# Patient Record
Sex: FEMALE | Race: WHITE | NOT HISPANIC OR LATINO | Employment: UNEMPLOYED | URBAN - NONMETROPOLITAN AREA
[De-identification: names, ages, dates, MRNs, and addresses within clinical notes are randomized per-mention and may not be internally consistent; named-entity substitution may affect disease eponyms.]

---

## 2018-12-17 ENCOUNTER — OCC HEALTH (OUTPATIENT)
Dept: OCCUPATIONAL MEDICINE | Age: 23
End: 2018-12-17

## 2018-12-17 VITALS
DIASTOLIC BLOOD PRESSURE: 70 MMHG | BODY MASS INDEX: 28.14 KG/M2 | HEIGHT: 69 IN | HEART RATE: 70 BPM | SYSTOLIC BLOOD PRESSURE: 120 MMHG | WEIGHT: 190 LBS

## 2018-12-17 DIAGNOSIS — Z02.89 ENCOUNTER FOR OCCUPATIONAL HEALTH EXAMINATION: Primary | ICD-10-CM

## 2018-12-17 DIAGNOSIS — Z02.89 VISIT FOR OCCUPATIONAL HEALTH EXAMINATION: ICD-10-CM

## 2018-12-17 PROCEDURE — OH142 DRUG SCREEN HAIR TEST - COLLECTION,MRO: Performed by: PHYSICIAN ASSISTANT

## 2018-12-17 PROCEDURE — OH063 AUDIOMETRIC TESTING INTERP: Performed by: PHYSICIAN ASSISTANT

## 2018-12-17 PROCEDURE — OH021 PRE PLACEMENT PHYSICAL EXAM: Performed by: PHYSICIAN ASSISTANT

## 2018-12-17 PROCEDURE — 92552 PURE TONE AUDIOMETRY AIR: CPT | Performed by: PHYSICIAN ASSISTANT

## 2019-03-08 ENCOUNTER — OCC HEALTH (OUTPATIENT)
Dept: OCCUPATIONAL MEDICINE | Age: 24
End: 2019-03-08

## 2019-03-08 VITALS
WEIGHT: 185 LBS | HEIGHT: 69 IN | BODY MASS INDEX: 27.4 KG/M2 | DIASTOLIC BLOOD PRESSURE: 62 MMHG | SYSTOLIC BLOOD PRESSURE: 112 MMHG | HEART RATE: 80 BPM

## 2019-03-08 DIAGNOSIS — Z02.89 VISIT FOR OCCUPATIONAL HEALTH EXAMINATION: ICD-10-CM

## 2019-03-08 DIAGNOSIS — Z02.89 ENCOUNTER FOR OCCUPATIONAL HEALTH EXAMINATION: Primary | ICD-10-CM

## 2019-03-08 DIAGNOSIS — Z11.1 SCREENING FOR TUBERCULOSIS: ICD-10-CM

## 2019-03-08 PROCEDURE — OH021 PRE PLACEMENT PHYSICAL EXAM: Performed by: PHYSICIAN ASSISTANT

## 2019-03-08 PROCEDURE — 86580 TB INTRADERMAL TEST: CPT | Performed by: PHYSICIAN ASSISTANT

## 2019-03-11 ENCOUNTER — OFFICE VISIT (OUTPATIENT)
Dept: OCCUPATIONAL MEDICINE | Age: 24
End: 2019-03-11

## 2019-03-11 DIAGNOSIS — Z02.89 ENCOUNTER FOR OCCUPATIONAL HEALTH EXAMINATION: Primary | ICD-10-CM

## 2019-03-11 LAB
INDURATION: 0 MM (ref 0–?)
SKIN TEST RESULT: NEGATIVE

## 2020-02-27 ENCOUNTER — HOSPITAL ENCOUNTER (OUTPATIENT)
Dept: HOSPITAL 63 - US | Age: 25
Discharge: HOME | End: 2020-02-27
Payer: COMMERCIAL

## 2020-02-27 DIAGNOSIS — N64.4: Primary | ICD-10-CM

## 2020-02-27 PROCEDURE — 76641 ULTRASOUND BREAST COMPLETE: CPT

## 2020-02-27 NOTE — RAD
Limited right breast ultrasound



INDICATION: 24-year-old woman with right breast pain most conspicuous in the

lower-outer quadrant. No reported palpable area of concern.



TECHNIQUE: Grayscale ultrasound imaging of the right breast was performed.



FINDINGS:

Sonographic survey of the right breast spanning the 3 through 12:00 positions

with focused attention at the 8:00 position 5 cm from the nipple in the area

of greatest focal breast pain was performed and revealed scattered

fibroglandular tissue with no dominant mass, architectural distortion or

suspicious sonographic findings.



IMPRESSION:



Negative right breast ultrasound. No evidence of malignancy.



Recommend clinical management (which may include biopsy of any clinical

suspicious findings if present on exam).

In the absence of any clinically suspicious findings, age-appropriate routine

screening starting at age 40 in average risk women is recommended.



BI-RADS Category 1

Negative